# Patient Record
Sex: MALE | Race: BLACK OR AFRICAN AMERICAN | NOT HISPANIC OR LATINO | Employment: UNEMPLOYED | URBAN - METROPOLITAN AREA
[De-identification: names, ages, dates, MRNs, and addresses within clinical notes are randomized per-mention and may not be internally consistent; named-entity substitution may affect disease eponyms.]

---

## 2024-05-14 ENCOUNTER — HOSPITAL ENCOUNTER (EMERGENCY)
Facility: HOSPITAL | Age: 49
Discharge: HOME | End: 2024-05-14
Attending: EMERGENCY MEDICINE

## 2024-05-14 VITALS
OXYGEN SATURATION: 96 % | WEIGHT: 285 LBS | DIASTOLIC BLOOD PRESSURE: 100 MMHG | HEIGHT: 72 IN | HEART RATE: 95 BPM | TEMPERATURE: 98.3 F | BODY MASS INDEX: 38.6 KG/M2 | SYSTOLIC BLOOD PRESSURE: 130 MMHG | RESPIRATION RATE: 19 BRPM

## 2024-05-14 DIAGNOSIS — M54.42 ACUTE LEFT-SIDED LOW BACK PAIN WITH LEFT-SIDED SCIATICA: Primary | ICD-10-CM

## 2024-05-14 DIAGNOSIS — Z76.89 ENCOUNTER TO ESTABLISH CARE WITH NEW DOCTOR: ICD-10-CM

## 2024-05-14 PROCEDURE — 99284 EMERGENCY DEPT VISIT MOD MDM: CPT

## 2024-05-14 PROCEDURE — 96372 THER/PROPH/DIAG INJ SC/IM: CPT

## 2024-05-14 PROCEDURE — 99283 EMERGENCY DEPT VISIT LOW MDM: CPT

## 2024-05-14 RX ORDER — TIZANIDINE 4 MG/1
4 TABLET ORAL ONCE
Status: COMPLETED | OUTPATIENT
Start: 2024-05-14 | End: 2024-05-14

## 2024-05-14 RX ORDER — TIZANIDINE HYDROCHLORIDE 4 MG/1
4 CAPSULE, GELATIN COATED ORAL 3 TIMES DAILY PRN
Qty: 15 CAPSULE | Refills: 0 | OUTPATIENT
Start: 2024-05-14 | End: 2024-05-26

## 2024-05-14 RX ORDER — IBUPROFEN 600 MG/1
TABLET ORAL
Qty: 18 TABLET | Refills: 0 | Status: SHIPPED | OUTPATIENT
Start: 2024-05-14 | End: 2024-05-17 | Stop reason: ALTCHOICE

## 2024-05-14 RX ORDER — ACETAMINOPHEN 325 MG/1
975 TABLET ORAL ONCE
Status: COMPLETED | OUTPATIENT
Start: 2024-05-14 | End: 2024-05-14

## 2024-05-14 RX ORDER — ACETAMINOPHEN 500 MG
1000 TABLET ORAL 3 TIMES DAILY PRN
Qty: 30 TABLET | Refills: 0 | Status: SHIPPED | OUTPATIENT
Start: 2024-05-14 | End: 2024-05-19

## 2024-05-14 RX ORDER — LIDOCAINE 50 MG/G
1 PATCH TOPICAL ONCE
Status: DISCONTINUED | OUTPATIENT
Start: 2024-05-14 | End: 2024-05-14 | Stop reason: HOSPADM

## 2024-05-14 RX ORDER — KETOROLAC TROMETHAMINE 30 MG/ML
30 INJECTION, SOLUTION INTRAMUSCULAR; INTRAVENOUS ONCE
Status: COMPLETED | OUTPATIENT
Start: 2024-05-14 | End: 2024-05-14

## 2024-05-14 RX ADMIN — KETOROLAC TROMETHAMINE 30 MG: 30 INJECTION, SOLUTION INTRAMUSCULAR; INTRAVENOUS at 16:16

## 2024-05-14 RX ADMIN — ACETAMINOPHEN 975 MG: 325 TABLET, FILM COATED ORAL at 16:15

## 2024-05-14 RX ADMIN — LIDOCAINE 5% 1 PATCH: 700 PATCH TOPICAL at 16:18

## 2024-05-14 RX ADMIN — TIZANIDINE 4 MG: 4 TABLET ORAL at 16:15

## 2024-05-14 NOTE — ED PROVIDER NOTES
History  Chief Complaint   Patient presents with    Back Pain     Pt reports his sciatic pain is acting up. Pt normally gets steroid injection with relief but pt just moved here. Did not take any medications today. pt able to ambulate.      The patient is a 48-year-old male with PMH of HTN, HLD, T2DM, and CHF presenting for evaluation of low back pain.  The patient notes having chronic low back pain with exacerbation over the last 3 to 4 days.  He endorses left middle to left lower back constant pain described as stiffness, aching, and shooting.  He notes radiation of pain through the left buttocks down the left posterior leg.  He notes chronic bilateral lower leg neuropathy without new numbness or tingling or weakness.  He notes movement and walking makes the pain worse.  Tylenol has been minimally helpful with his pain control.  He reports moving here 3 weeks ago from [place] for which she hopes to establish care with both primary care.  He reports having access to his current medications and does not have refills.  He denies associated fevers, saddle anesthesia, urinary retention, urinary/bowel incontinence, new numbness or weakness, and prior back surgeries.  He notes having injections in his back 3 to 5 years ago but denies recent steroid use and/or spinal injections.  He denies history of IVDU.      History provided by:  Patient and significant other   used: No        None       Past Medical History:   Diagnosis Date    CHF (congestive heart failure) (HCC)     Diabetes (HCC)     Gout     Hyperlipemia     Hypertension     Sciatica        History reviewed. No pertinent surgical history.    History reviewed. No pertinent family history.  I have reviewed and agree with the history as documented.    E-Cigarette/Vaping     E-Cigarette/Vaping Substances     Social History     Tobacco Use    Smoking status: Never    Smokeless tobacco: Never   Substance Use Topics    Alcohol use: Not  Currently    Drug use: Not Currently       Review of Systems   Respiratory:  Negative for shortness of breath.    Cardiovascular:  Negative for chest pain.   Gastrointestinal:  Negative for abdominal pain, constipation, nausea and vomiting.   Genitourinary: Negative.    Musculoskeletal:  Positive for arthralgias (Bilateral hips, left side worse), back pain and gait problem (Due to low back pain). Negative for joint swelling, myalgias, neck pain and neck stiffness.   Skin:  Negative for color change, pallor, rash and wound.   Neurological:  Positive for numbness (Chronic lower leg numbness due to neuropathy, no change from baseline). Negative for weakness.   All other systems reviewed and are negative.      Physical Exam  Physical Exam  Vitals and nursing note reviewed.   Constitutional:       General: He is not in acute distress (Evidencing mild discomfort, in no acute distress).     Appearance: Normal appearance. He is well-developed. He is not ill-appearing, toxic-appearing or diaphoretic.   HENT:      Head: Normocephalic and atraumatic.      Jaw: There is normal jaw occlusion.      Nose: Nose normal.      Mouth/Throat:      Lips: Pink.      Mouth: Mucous membranes are moist.   Eyes:      Extraocular Movements: Extraocular movements intact.      Conjunctiva/sclera: Conjunctivae normal.   Cardiovascular:      Rate and Rhythm: Normal rate and regular rhythm.      Pulses: Normal pulses.           Radial pulses are 2+ on the right side and 2+ on the left side.        Dorsalis pedis pulses are 2+ on the right side and 2+ on the left side.      Heart sounds: Normal heart sounds, S1 normal and S2 normal. No murmur heard.  Pulmonary:      Effort: Pulmonary effort is normal. No tachypnea or respiratory distress.      Breath sounds: Normal breath sounds and air entry. No stridor, decreased air movement or transmitted upper airway sounds. No decreased breath sounds.   Abdominal:      Palpations: Abdomen is soft.       Tenderness: There is no abdominal tenderness. There is no right CVA tenderness or left CVA tenderness.   Musculoskeletal:      Cervical back: Normal, normal range of motion and neck supple. No swelling, edema, deformity, erythema or bony tenderness. No spinous process tenderness.      Thoracic back: Tenderness (Lower thoracic paraspinal muscle tenderness) present. No swelling, edema, deformity, signs of trauma or bony tenderness. Normal range of motion.      Lumbar back: Tenderness present. No swelling, edema, deformity, signs of trauma or bony tenderness. Decreased range of motion. Positive left straight leg raise test. Negative right straight leg raise test.        Back:       Right hip: Normal. No deformity, tenderness, bony tenderness or crepitus. Normal range of motion.      Left hip: No deformity, tenderness, bony tenderness or crepitus. Decreased range of motion (Secondary to low back pain).      Right upper leg: Normal.      Left upper leg: Normal.      Right knee: Normal.      Left knee: Normal.      Right lower leg: Normal. No edema.      Left lower leg: Normal. No edema.      Right ankle: Normal.      Left ankle: Normal.   Skin:     General: Skin is warm and dry.      Capillary Refill: Capillary refill takes less than 2 seconds.      Findings: No rash or wound.   Neurological:      General: No focal deficit present.      Mental Status: He is alert and oriented to person, place, and time. Mental status is at baseline.      GCS: GCS eye subscore is 4. GCS verbal subscore is 5. GCS motor subscore is 6.      Sensory: Sensation is intact.      Motor: Motor function is intact.      Gait: Gait is intact.      Deep Tendon Reflexes:      Reflex Scores:       Patellar reflexes are 2+ on the right side and 2+ on the left side.        Vital Signs  ED Triage Vitals [05/14/24 1543]   Temperature Pulse Respirations Blood Pressure SpO2   98.3 °F (36.8 °C) 95 19 130/100 96 %      Temp Source Heart Rate Source Patient  Position - Orthostatic VS BP Location FiO2 (%)   Temporal Monitor Sitting Right arm --      Pain Score       10 - Worst Possible Pain           Vitals:    05/14/24 1543   BP: 130/100   Pulse: 95   Patient Position - Orthostatic VS: Sitting         Visual Acuity      ED Medications  Medications   lidocaine (LIDODERM) 5 % patch 1 patch (1 patch Topical Medication Applied 5/14/24 1618)   ketorolac (TORADOL) injection 30 mg (30 mg Intramuscular Given 5/14/24 1616)   acetaminophen (TYLENOL) tablet 975 mg (975 mg Oral Given 5/14/24 1615)   tiZANidine (ZANAFLEX) tablet 4 mg (4 mg Oral Given 5/14/24 1615)       Diagnostic Studies  Results Reviewed       None                   No orders to display              Procedures  Procedures         ED Course                                             Medical Decision Making  DDx including but not limited to: Strain, sprain, sciatica, herniated disc, arthritis, spinal stenosis; considered but doubt fracture; considered but doubt infectious etiology such as epidural abscess    The patient presents with acute on chronic back pain exacerbation.  No midline tenderness.  No overlying skin changes.  Range of motion limited secondary to left lower back pain but patient with full strength.  Bilateral lower leg decreased sensation likely secondary to neuropathy, however patient still able to feel both legs equally.  Reflexes intact.  Discussed obtaining baseline imaging as he is new to the area with no prior documentation.  He at this time wishes to receive pain control and follow-up outpatient and obtain imaging if necessary then.  He is in between insurances and would like to try to figure that out prior to further workup or testing.  Given overall stability of exam and absence of red flags, will start patient on course of NSAIDs, Tylenol, as needed muscle relaxants, referral comfort spine, as well as follow-up with primary care.    Problems Addressed:  Acute left-sided low back pain with  left-sided sciatica: acute illness or injury  Encounter to establish care with new doctor: self-limited or minor problem    Amount and/or Complexity of Data Reviewed  Independent Historian: spouse    Risk  OTC drugs.  Prescription drug management.             Disposition  Final diagnoses:   Acute left-sided low back pain with left-sided sciatica   Encounter to establish care with new doctor     Time reflects when diagnosis was documented in both MDM as applicable and the Disposition within this note       Time User Action Codes Description Comment    5/14/2024  4:02 PM Christine Arrington Add [M54.42] Acute left-sided low back pain with left-sided sciatica     5/14/2024  4:03 PM Christine Arrington [Z76.89] Encounter to establish care with new doctor           ED Disposition       ED Disposition   Discharge    Condition   Stable    Date/Time   Tue May 14, 2024  4:02 PM    Comment   Vamsi Olmedo discharge to home/self care.                   Follow-up Information       Follow up With Specialties Details Why Contact Info Additional Information    [facility] Internal Medicine Schedule an appointment as soon as possible for a visit in 1 week  [address] [place] [id number]  [phone] [facility], [address] [place] [address], [id number] [phone]            Discharge Medication List as of 5/14/2024  4:07 PM        START taking these medications    Details   acetaminophen (TYLENOL) 500 mg tablet Take 2 tablets (1,000 mg total) by mouth 3 (three) times a day as needed for mild pain or moderate pain for up to 5 days, Starting Tue 5/14/2024, Until Sun 5/19/2024 at 2359, Normal      ibuprofen (MOTRIN) 600 mg tablet Multiple Dosages:Starting Tue 5/14/2024, Until Thu 5/16/2024, THEN Starting Fri 5/17/2024, Until Sun 5/19/2024Take 1 tablet (600 mg total) by mouth 3 (three) times a day with meals for 3 days, THEN 1 tablet  (600 mg total) 3 (three) times a day as need ed for mild pain for up to 3 days., Normal      TiZANidine (ZANAFLEX) 4 MG capsule Take 1 capsule (4 mg total) by mouth 3 (three) times a day as needed for muscle spasms for up to 5 days, Starting Tue 5/14/2024, Until Sun 5/19/2024 at 2359, Normal                 PDMP Review       None            ED Provider  Electronically Signed by             ALISON Plascencia  05/14/24 6349

## 2024-05-14 NOTE — DISCHARGE INSTRUCTIONS
Start taking the prescribed ibuprofen (Motrin) 3 times daily with food for the next 3 days.  Use the prescribed high-dose acetaminophen (1000 mg of Tylenol) every 8 hours for total of 3 doses daily for breakthrough pain.  Use topical therapies such as Aspercreme or Bengay cream or heating pad for comfort.  Use the prescribed tizanidine (Zanaflex) up to 3 times a day for muscle spasms.    I have placed referrals for both comprehensive spine and primary care.  Follow-up with comprehensive spine program as they may direct you to physical therapy and/or a spine specialist.  Follow-up with primary care as he may establish care locally for your other existing medical problems.    Return to the ER if develop fever, new numbness or weakness, inability to walk, severe pain that is not responding to the current regimen, confusion, or lethargy.

## 2024-05-15 ENCOUNTER — TELEPHONE (OUTPATIENT)
Dept: PHYSICAL THERAPY | Facility: OTHER | Age: 49
End: 2024-05-15

## 2024-05-15 NOTE — TELEPHONE ENCOUNTER
Call placed to the patient per Comprehensive Spine program referral.    V/m left for patient to call back. Phone number and hours of business provided.    Thi sis the 1st attempt to reach the patient. Will defer referral per protocol.    H . I ??

## 2024-05-17 ENCOUNTER — OFFICE VISIT (OUTPATIENT)
Dept: FAMILY MEDICINE CLINIC | Facility: CLINIC | Age: 49
End: 2024-05-17

## 2024-05-17 VITALS
HEIGHT: 73 IN | OXYGEN SATURATION: 97 % | RESPIRATION RATE: 17 BRPM | BODY MASS INDEX: 39.04 KG/M2 | WEIGHT: 294.6 LBS | HEART RATE: 89 BPM | SYSTOLIC BLOOD PRESSURE: 120 MMHG | TEMPERATURE: 96.5 F | DIASTOLIC BLOOD PRESSURE: 82 MMHG

## 2024-05-17 DIAGNOSIS — I50.9 CONGESTIVE HEART FAILURE, UNSPECIFIED HF CHRONICITY, UNSPECIFIED HEART FAILURE TYPE (HCC): ICD-10-CM

## 2024-05-17 DIAGNOSIS — I10 HYPERTENSION, UNSPECIFIED TYPE: ICD-10-CM

## 2024-05-17 DIAGNOSIS — E11.69 TYPE 2 DIABETES MELLITUS WITH OTHER SPECIFIED COMPLICATION, WITH LONG-TERM CURRENT USE OF INSULIN (HCC): ICD-10-CM

## 2024-05-17 DIAGNOSIS — Z79.4 TYPE 2 DIABETES MELLITUS WITH OTHER SPECIFIED COMPLICATION, WITH LONG-TERM CURRENT USE OF INSULIN (HCC): ICD-10-CM

## 2024-05-17 DIAGNOSIS — M54.32 SCIATICA OF LEFT SIDE: ICD-10-CM

## 2024-05-17 DIAGNOSIS — Z76.89 ENCOUNTER TO ESTABLISH CARE WITH NEW DOCTOR: Primary | ICD-10-CM

## 2024-05-17 PROCEDURE — 99204 OFFICE O/P NEW MOD 45 MIN: CPT

## 2024-05-17 RX ORDER — COLCHICINE 0.6 MG/1
0.6 TABLET ORAL 2 TIMES DAILY PRN
COMMUNITY

## 2024-05-17 RX ORDER — SCOLOPAMINE TRANSDERMAL SYSTEM 1 MG/1
1 PATCH, EXTENDED RELEASE TRANSDERMAL
COMMUNITY

## 2024-05-17 RX ORDER — ARIPIPRAZOLE 10 MG/1
10 TABLET ORAL DAILY
COMMUNITY

## 2024-05-17 RX ORDER — GABAPENTIN 300 MG/1
300 CAPSULE ORAL
COMMUNITY

## 2024-05-17 RX ORDER — OMEPRAZOLE 40 MG/1
40 CAPSULE, DELAYED RELEASE ORAL DAILY
COMMUNITY
Start: 2024-04-19

## 2024-05-17 RX ORDER — FUROSEMIDE 40 MG/1
40 TABLET ORAL DAILY
COMMUNITY

## 2024-05-17 RX ORDER — QUETIAPINE FUMARATE 50 MG/1
50 TABLET, FILM COATED ORAL
COMMUNITY
End: 2024-05-17 | Stop reason: ALTCHOICE

## 2024-05-17 RX ORDER — SPIRONOLACTONE 25 MG/1
25 TABLET ORAL DAILY
COMMUNITY

## 2024-05-17 RX ORDER — PEN NEEDLE, DIABETIC 32GX 5/32"
1 NEEDLE, DISPOSABLE MISCELLANEOUS
COMMUNITY
Start: 2024-02-09

## 2024-05-17 RX ORDER — HYDROXYZINE HYDROCHLORIDE 25 MG/1
25 TABLET, FILM COATED ORAL
COMMUNITY

## 2024-05-17 RX ORDER — SACUBITRIL AND VALSARTAN 24; 26 MG/1; MG/1
1 TABLET, FILM COATED ORAL 2 TIMES DAILY
COMMUNITY

## 2024-05-17 RX ORDER — ATORVASTATIN CALCIUM 80 MG/1
80 TABLET, FILM COATED ORAL DAILY
COMMUNITY

## 2024-05-17 RX ORDER — PREDNISONE 10 MG/1
10 TABLET ORAL DAILY
Qty: 30 TABLET | Refills: 0 | OUTPATIENT
Start: 2024-05-17 | End: 2024-05-26

## 2024-05-17 RX ORDER — GLUCOSAM/CHON-MSM1/C/MANG/BOSW 500-416.6
1 TABLET ORAL 2 TIMES DAILY
COMMUNITY
Start: 2024-05-14

## 2024-05-17 RX ORDER — ONDANSETRON 4 MG/1
4 TABLET, ORALLY DISINTEGRATING ORAL EVERY 6 HOURS PRN
COMMUNITY

## 2024-05-17 RX ORDER — LAMOTRIGINE 150 MG/1
150 TABLET ORAL 2 TIMES DAILY
COMMUNITY

## 2024-05-17 RX ORDER — CALCIUM CITRATE/VITAMIN D3 200MG-6.25
1 TABLET ORAL 3 TIMES DAILY PRN
COMMUNITY
Start: 2024-03-15

## 2024-05-17 RX ORDER — AMITRIPTYLINE HYDROCHLORIDE 10 MG/1
10 TABLET, FILM COATED ORAL
COMMUNITY

## 2024-05-17 RX ORDER — METOPROLOL SUCCINATE 100 MG/1
100 TABLET, EXTENDED RELEASE ORAL
COMMUNITY

## 2024-05-17 RX ORDER — POTASSIUM CHLORIDE 20 MEQ/1
20 TABLET, EXTENDED RELEASE ORAL DAILY
COMMUNITY

## 2024-05-17 RX ORDER — DULOXETIN HYDROCHLORIDE 60 MG/1
60 CAPSULE, DELAYED RELEASE ORAL DAILY
COMMUNITY

## 2024-05-17 RX ORDER — DAPAGLIFLOZIN 10 MG/1
TABLET, FILM COATED ORAL DAILY
COMMUNITY

## 2024-05-17 RX ORDER — INSULIN GLARGINE 100 [IU]/ML
58 INJECTION, SOLUTION SUBCUTANEOUS
COMMUNITY

## 2024-05-17 RX ORDER — OMEPRAZOLE 20 MG/1
20 CAPSULE, DELAYED RELEASE ORAL DAILY
COMMUNITY
End: 2024-05-17 | Stop reason: DRUGHIGH

## 2024-05-17 RX ORDER — ERGOCALCIFEROL 1.25 MG/1
50000 CAPSULE ORAL WEEKLY
COMMUNITY

## 2024-05-17 NOTE — ASSESSMENT & PLAN NOTE
Implanted defibrillator first 2014, replaced 09/2023. Referral to cardiology placed today. Patient to obtain labs ordered today.    Wt Readings from Last 3 Encounters:   05/17/24 134 kg (294 lb 9.6 oz)   05/14/24 129 kg (285 lb)

## 2024-05-17 NOTE — PROGRESS NOTES
Ambulatory Visit  Name: Vamsi Olmedo      : 1975      MRN: [id number]  Encounter Provider: ALISON Cabello  Encounter Date: 2024   Encounter department: [facility]    Assessment & Plan   1. Encounter to establish care with new doctor  -     Ambulatory Referral to Family Practice  -     CBC and differential; Future  -     predniSONE 10 mg tablet; Take 1 tablet (10 mg total) by mouth daily Take 4 tablets for the first 3 days, 3 tablets for the next 3 days, 2 tablets for the following 3 days, and 1 tablet for the last 3 days  -     Comprehensive metabolic panel; Future  -     Lipid Panel with Direct LDL reflex; Future  -     TSH, 3rd generation with Free T4 reflex; Future  -     Hemoglobin A1C; Future  -     UA (URINE) with reflex to Scope; Future  -     Ambulatory Referral to Orthopedic Surgery; Future  -     Ambulatory Referral to Endocrinology; Future  -     Ambulatory Referral to Neurology; Future  -     Ambulatory referral to Psych Services; Future  -     Ambulatory Referral to Social Work Care Management Program; Future  -     Ambulatory Referral to Gastroenterology; Future  -     Ambulatory Referral to Ophthalmology; Future  -     Ambulatory Referral to Dentistry; Future  -     Ambulatory Referral to Cardiology; Future  -     Ambulatory Referral to Nephrology; Future  -     Ambulatory Referral to Podiatry; Future  -     Ambulatory Referral to Physical Therapy; Future  2. Congestive heart failure, unspecified HF chronicity, unspecified heart failure type (HCC)  Assessment & Plan:  Implanted defibrillator first , replaced 2023. Referral to cardiology placed today. Patient to obtain labs ordered today.    Wt Readings from Last 3 Encounters:   24 134 kg (294 lb 9.6 oz)   24 129 kg (285 lb)             Orders:  -     CBC and differential; Future  -     Comprehensive metabolic panel; Future  -     Lipid Panel with Direct LDL reflex; Future  3.  "Sciatica of left side  Assessment & Plan:  Referrals to orthopedics and physical therapy placed today. Prednisone taper ordered today. Patient aware of likely blood sugar elevation. Patient to obtain labs ordered today.  Orders:  -     predniSONE 10 mg tablet; Take 1 tablet (10 mg total) by mouth daily Take 4 tablets for the first 3 days, 3 tablets for the next 3 days, 2 tablets for the following 3 days, and 1 tablet for the last 3 days  -     Ambulatory Referral to Orthopedic Surgery; Future  -     Ambulatory Referral to Physical Therapy; Future  4. Type 2 diabetes mellitus with other specified complication, with long-term current use of insulin (HCC)  Assessment & Plan:  Referral to endocrinology placed today. Patient to obtain labs ordered today.  No results found for: \"HGBA1C\"  Orders:  -     Ambulatory Referral to Endocrinology; Future  -     Ambulatory Referral to Social Work Care Management Program; Future  -     Ambulatory Referral to Ophthalmology; Future  -     Ambulatory Referral to Nephrology; Future  -     Ambulatory Referral to Podiatry; Future  5. Hypertension, unspecified type  Assessment & Plan:  Current defibrillator implanted originally 2014 and replaced 09/2023. Referral to cardiology placed today. Patient to obtain labs ordered today.    Referrals to multiple specialists placed today. The patient will obtain the lab work ordered today prior to the next scheduled appointment. The patient will be notified of the results when available. Follow up in 3 months or as needed.    Depression Screening and Follow-up Plan: Patient's depression screening was positive with a PHQ-2 score of 4. Their PHQ-9 score was 12. Continue regular follow-up with their mental health provider who is managing their mental health condition(s). Patient with underlying depression and was advised to continue current medications as prescribed.       History of Present Illness     Vamsi Olmedo is a 48 year old male who presents " today to establish care. He relocated from [place], [place] 3-4 weeks ago. Moved here with his girlfriend Trinity who is with him today. Previous medical system was [facility] in [place], [place].  The patient reports following with multiple specialists including cardiology, psychiatry, endocrine, neurology, nephrology, opthalmology, gastroenterology, and podiatry. He reports current neuropathy of feet and hands. He reports he checks his blood sugar 2-3x/day, mostly morning and night. The patient reports current usage of mag citrate for constipation with relief. The patient was recently seen in the emergency department due to back pain and worsening sciatica. He reports current back pain with radiation to his legs. He has followed with orthopedics in the past and would reports steroid injection has been beneficial in the past.  The patient would like referrals to local specialists including dentistry.    Back Pain  This is a chronic problem. The problem is unchanged. The pain is present in the lumbar spine. The quality of the pain is described as shooting and aching. The pain radiates to the left knee and left thigh. The pain is moderate. The symptoms are aggravated by sitting. Associated symptoms include numbness (hands and feet) and tingling. Pertinent negatives include no abdominal pain, chest pain, dysuria, fever, headaches or weakness. Risk factors include obesity and lack of exercise. He has tried analgesics and heat for the symptoms. The treatment provided mild relief.       Review of Systems   Constitutional: Negative.  Negative for chills, fatigue and fever.   HENT: Negative.  Negative for congestion, ear pain, rhinorrhea and sore throat.    Eyes: Negative.  Negative for pain and visual disturbance.   Respiratory: Negative.  Negative for cough and shortness of breath.    Cardiovascular: Negative.  Negative for chest pain, palpitations and leg swelling.   Gastrointestinal:  Negative for abdominal pain, constipation,  "diarrhea, nausea and vomiting.   Endocrine: Negative.    Genitourinary: Negative.  Negative for dysuria, frequency and urgency.   Musculoskeletal:  Positive for arthralgias, back pain and gait problem. Negative for joint swelling, myalgias and neck pain.   Skin: Negative.  Negative for rash.   Allergic/Immunologic: Negative.    Neurological:  Positive for tingling and numbness (hands and feet). Negative for dizziness, weakness, light-headedness and headaches.   Hematological: Negative.    Psychiatric/Behavioral: Negative.         Objective     /82 (BP Location: Left arm, Patient Position: Sitting, Cuff Size: Large)   Pulse 89   Temp (!) 96.5 °F (35.8 °C) (Tympanic)   Resp 17   Ht 6' 1\" (1.854 m)   Wt 134 kg (294 lb 9.6 oz)   SpO2 97%   BMI 38.87 kg/m²     Physical Exam  Vitals and nursing note reviewed.   Constitutional:       General: He is not in acute distress.     Appearance: Normal appearance. He is obese. He is not ill-appearing.   HENT:      Head: Normocephalic and atraumatic.   Cardiovascular:      Rate and Rhythm: Normal rate and regular rhythm.      Heart sounds: Normal heart sounds. No murmur heard.  Pulmonary:      Effort: Pulmonary effort is normal. No respiratory distress.      Breath sounds: Normal breath sounds. No wheezing.   Abdominal:      General: Bowel sounds are normal.      Palpations: Abdomen is soft.      Tenderness: There is no abdominal tenderness.   Musculoskeletal:         General: Tenderness present. No deformity.      Cervical back: Normal range of motion. No tenderness.      Lumbar back: Bony tenderness present. No swelling or tenderness. Decreased range of motion. Positive left straight leg raise test.        Back:    Skin:     General: Skin is warm and dry.      Capillary Refill: Capillary refill takes less than 2 seconds.   Neurological:      General: No focal deficit present.      Mental Status: He is alert and oriented to person, place, and time.   Psychiatric:       "   Mood and Affect: Mood normal.         Behavior: Behavior normal.       Administrative Statements

## 2024-05-20 ENCOUNTER — TELEPHONE (OUTPATIENT)
Dept: PSYCHIATRY | Facility: CLINIC | Age: 49
End: 2024-05-20

## 2024-05-20 NOTE — ASSESSMENT & PLAN NOTE
"Referral to endocrinology placed today. Patient to obtain labs ordered today.  No results found for: \"HGBA1C\"  "

## 2024-05-20 NOTE — ASSESSMENT & PLAN NOTE
Referrals to orthopedics and physical therapy placed today. Prednisone taper ordered today. Patient aware of likely blood sugar elevation. Patient to obtain labs ordered today.

## 2024-05-20 NOTE — TELEPHONE ENCOUNTER
Contacted patient in regards to Routine Referral in attempts to verify patient's needs of services and add patient to proper wait list. Writer left  to call intake at [phone]

## 2024-05-20 NOTE — ASSESSMENT & PLAN NOTE
Current defibrillator implanted originally 2014 and replaced 09/2023. Referral to cardiology placed today. Patient to obtain labs ordered today.

## 2024-05-22 ENCOUNTER — PATIENT OUTREACH (OUTPATIENT)
Dept: FAMILY MEDICINE CLINIC | Facility: CLINIC | Age: 49
End: 2024-05-22

## 2024-05-22 NOTE — PROGRESS NOTES
ASHA BOBBY received a referral for patient from their PCP with no reason for referral provided. ASHA BOBBY reviewed patient's chart and called patient ([phone]). Patient answered and ASHA BOBBY introduced kristen and reason for calling. ASHA BOBBY completed SOC psychosocial assessment with patient.     Patient just recently moved from [place] and is living with his SO in a family memebers home. Patient had MA in [place] and has applied for it in [place]. Patient reports he needs to submit additional documentation that he is waiting on from [place]. He is unemployed and does not have any income. He stated that when he was in [place] he applied for SSD and was denied but appealed with documentation from his doctor. Patient stated he is waiting for the outcome of the appeal. He utilizes an SPC but is independent with all ADLs/ IADLs. Due to neuropathy, he cannot drive but he does have a car. Patient reports his OS drives him to appointments. He did switch his SNAP from [place] to [place] and it will be active as of 6/7/24. Patient does report MH hx but no current SI, HI or plan to harm himself or others. ASHA BOBBY sent patient list of in network MH providers from the [facility] [employer] once his MA is approved so that he can call and make an appointment ([email or web address]). Patient had no other questions, concerns or needs. ASHA BOBBY will close. Please re consult ASHA BOBBY as needed.       Waiting for insuranc eto go through to establish with OP MH resources.     [email or web address]

## 2024-05-24 ENCOUNTER — TELEPHONE (OUTPATIENT)
Dept: PSYCHIATRY | Facility: CLINIC | Age: 49
End: 2024-05-24

## 2024-05-24 NOTE — TELEPHONE ENCOUNTER
Contacted patient in regards to Routine Referral in attempts to verify patient's needs of services and add patient to proper wait list. Writer left vm to call intake at [phone]      Attempt #2

## 2024-05-26 ENCOUNTER — HOSPITAL ENCOUNTER (EMERGENCY)
Facility: HOSPITAL | Age: 49
Discharge: HOME | End: 2024-05-26
Attending: EMERGENCY MEDICINE

## 2024-05-26 VITALS
DIASTOLIC BLOOD PRESSURE: 80 MMHG | RESPIRATION RATE: 18 BRPM | HEART RATE: 88 BPM | OXYGEN SATURATION: 97 % | SYSTOLIC BLOOD PRESSURE: 117 MMHG | TEMPERATURE: 97.7 F

## 2024-05-26 DIAGNOSIS — M54.32 SCIATICA OF LEFT SIDE: Primary | ICD-10-CM

## 2024-05-26 PROCEDURE — 99284 EMERGENCY DEPT VISIT MOD MDM: CPT | Performed by: EMERGENCY MEDICINE

## 2024-05-26 PROCEDURE — 96372 THER/PROPH/DIAG INJ SC/IM: CPT

## 2024-05-26 PROCEDURE — 99283 EMERGENCY DEPT VISIT LOW MDM: CPT

## 2024-05-26 RX ORDER — KETOROLAC TROMETHAMINE 30 MG/ML
30 INJECTION, SOLUTION INTRAMUSCULAR; INTRAVENOUS ONCE
Status: COMPLETED | OUTPATIENT
Start: 2024-05-26 | End: 2024-05-26

## 2024-05-26 RX ORDER — METHOCARBAMOL 500 MG/1
500 TABLET, FILM COATED ORAL 4 TIMES DAILY PRN
Qty: 20 TABLET | Refills: 0 | Status: SHIPPED | OUTPATIENT
Start: 2024-05-26

## 2024-05-26 RX ORDER — KETOROLAC TROMETHAMINE 30 MG/ML
30 INJECTION, SOLUTION INTRAMUSCULAR; INTRAVENOUS ONCE
Status: DISCONTINUED | OUTPATIENT
Start: 2024-05-26 | End: 2024-05-26

## 2024-05-26 RX ADMIN — KETOROLAC TROMETHAMINE 30 MG: 30 INJECTION, SOLUTION INTRAMUSCULAR; INTRAVENOUS at 11:08

## 2024-05-26 NOTE — ED PROVIDER NOTES
History  Chief Complaint   Patient presents with    Back Pain     Patient presents to the ED with c/o sciatic pain for a few weeks, states he was put on steroids and it did not help.      Hx from patient and wife, [facility] copd, chf, DM, sciatica, htn, schizoaffective, GERD.  Patient c/o Exacerbation of chronic back pain.  He is new to the area.  He has left low back pain radiating down left leg to above ankle.  Sharp shooting pain worse with movement, worsening over 3 weeks now.  No specific injury.  No new numbness or weakness, no loss of bowel or bladder control.   No relief with prednisone and lidocaine patch Tylenol or tizanidine.  His blood sugar spiked from the prednisone.        Prior to Admission Medications   Prescriptions Last Dose Informant Patient Reported? Taking?   ARIPiprazole (ABILIFY) 10 mg tablet  Self Yes No   Sig: Take 10 mg by mouth daily   DULoxetine (CYMBALTA) 60 mg delayed release capsule  Self Yes No   Sig: Take 60 mg by mouth daily   Diclofenac Sodium (VOLTAREN) 1 %  Self Yes No   Sig: Apply 2 g topically 4 (four) times a day   Insulin Glargine Solostar (Basaglar KwikPen) 100 UNIT/ML SOPN  Self Yes No   Sig: Inject 58 Units under the skin once daily   TRUEplus 5-Bevel Pen Needles 32G X 4 MM MISC   Yes No   Sig: Inject 1 each under the skin   TRUEplus Lancets 30G MISC   Yes No   Si Lancet 2 (two) times a day Check blood sugar   TiZANidine (ZANAFLEX) 4 MG capsule  Self No No   Sig: Take 1 capsule (4 mg total) by mouth 3 (three) times a day as needed for muscle spasms for up to 5 days   True Metrix Blood Glucose Test test strip   Yes No   Sig: Use 1 each 3 (three) times a day as needed   amitriptyline (ELAVIL) 10 mg tablet  Self Yes No   Sig: Take 10 mg by mouth daily at bedtime   atorvastatin (LIPITOR) 80 mg tablet  Self Yes No   Sig: Take 80 mg by mouth daily   colchicine (COLCRYS) 0.6 mg tablet   Yes No   Sig: Take 0.6 mg by mouth 2 (two) times a day as needed for muscle/joint pain    dapagliflozin (Farxiga) 10 MG tablet  Self Yes No   Sig: Take by mouth daily   dulaglutide (Trulicity) 4.5 MG/0.5ML injection  Self Yes No   Sig: Inject 4.5 mg under the skin every 7 days   ergocalciferol (ERGOCALCIFEROL) 1.25 MG (30905 UT) capsule  Self Yes No   Sig: Take 50,000 Units by mouth once a week   furosemide (LASIX) 40 mg tablet  Self Yes No   Sig: Take 40 mg by mouth daily   gabapentin (NEURONTIN) 300 mg capsule  Self Yes No   Sig: Take 300 mg by mouth Takes 900mg daily   hydrOXYzine HCL (ATARAX) 25 mg tablet  Self Yes No   Sig: Take 25 mg by mouth daily at bedtime as needed for itching   lamoTRIgine (LaMICtal) 150 MG tablet  Self Yes No   Sig: Take 150 mg by mouth 2 (two) times a day   metFORMIN (GLUCOPHAGE) 1000 MG tablet  Self Yes No   Sig: Take 1,000 mg by mouth 2 (two) times a day with meals   metoprolol succinate (TOPROL-XL) 100 mg 24 hr tablet  Self Yes No   Sig: Take 100 mg by mouth Takes 1 tablet in a.m. and 2 tablets p.m.   omeprazole (PriLOSEC) 40 MG capsule   Yes No   Sig: Take 40 mg by mouth daily   ondansetron (ZOFRAN-ODT) 4 mg disintegrating tablet  Self Yes No   Sig: Take 4 mg by mouth every 6 (six) hours as needed for nausea or vomiting   potassium chloride (Klor-Con M20) 20 mEq tablet  Self Yes No   Sig: Take 20 mEq by mouth daily   predniSONE 10 mg tablet   No No   Sig: Take 1 tablet (10 mg total) by mouth daily Take 4 tablets for the first 3 days, 3 tablets for the next 3 days, 2 tablets for the following 3 days, and 1 tablet for the last 3 days   sacubitril-valsartan (Entresto) 24-26 MG TABS  Self Yes No   Sig: Take 1 tablet by mouth 2 (two) times a day   scopolamine (TRANSDERM-SCOP) 1 mg/3 days TD 72 hr patch  Self Yes No   Sig: Place 1 patch on the skin every third day   spironolactone (ALDACTONE) 25 mg tablet  Self Yes No   Sig: Take 25 mg by mouth daily      Facility-Administered Medications: None       Past Medical History:   Diagnosis Date    Alcohol abuse     sober for 1  year with 1 relapse - currently not drinking    Arthritis     CHF (congestive heart failure) (HCC)     Diabetes (HCC)     Diabetic neuropathy associated with type 2 diabetes mellitus (HCC)     GERD (gastroesophageal reflux disease)     Gout     Hyperlipemia     Hypertension     Memory loss     Obesity     Schizo affective schizophrenia (HCC)     Sciatica     Seizures (HCC)     2018 - was in coma    Visual impairment        Past Surgical History:   Procedure Laterality Date    CARDIAC DEFIBRILLATOR PLACEMENT      2014    CARDIAC DEFIBRILLATOR PLACEMENT      replaced september 2023    CARDIAC DEFIBRILLATOR REMOVAL      Sept 2023       History reviewed. No pertinent family history.  I have reviewed and agree with the history as documented.    E-Cigarette/Vaping    E-Cigarette Use Never User      E-Cigarette/Vaping Substances     Social History     Tobacco Use    Smoking status: Former     Types: Cigars    Smokeless tobacco: Never   Vaping Use    Vaping status: Never Used   Substance Use Topics    Alcohol use: Not Currently    Drug use: Not Currently     Types: Marijuana       Review of Systems   Constitutional:  Negative for chills and fever.   HENT:  Negative for ear pain and sore throat.    Eyes:  Negative for pain and visual disturbance.   Respiratory:  Negative for cough and shortness of breath.    Cardiovascular:  Negative for chest pain and palpitations.   Gastrointestinal:  Negative for abdominal pain and vomiting.   Genitourinary:  Negative for dysuria and hematuria.   Musculoskeletal:  Positive for back pain. Negative for arthralgias.   Skin:  Negative for color change and rash.   Neurological:  Negative for seizures and syncope.   All other systems reviewed and are negative.      Physical Exam  Physical Exam  Vitals and nursing note reviewed.   Constitutional:       General: He is not in acute distress.     Appearance: He is well-developed. He is obese.   HENT:      Head: Normocephalic and atraumatic.   Eyes:       Conjunctiva/sclera: Conjunctivae normal.   Cardiovascular:      Rate and Rhythm: Normal rate and regular rhythm.      Heart sounds: No murmur heard.  Pulmonary:      Effort: Pulmonary effort is normal. No respiratory distress.      Breath sounds: Normal breath sounds.   Abdominal:      Palpations: Abdomen is soft.      Tenderness: There is no abdominal tenderness.   Musculoskeletal:         General: No swelling.      Cervical back: Neck supple. No bony tenderness.      Thoracic back: No bony tenderness.      Lumbar back: Tenderness present. No bony tenderness. Positive left straight leg raise test. Negative right straight leg raise test.        Back:    Skin:     General: Skin is warm and dry.      Capillary Refill: Capillary refill takes less than 2 seconds.   Neurological:      Mental Status: He is alert.      Sensory: No sensory deficit.      Motor: No weakness.   Psychiatric:         Mood and Affect: Mood normal.         Vital Signs  ED Triage Vitals   Temperature Pulse Respirations Blood Pressure SpO2   05/26/24 1005 05/26/24 1005 05/26/24 1005 05/26/24 1007 05/26/24 1005   97.7 °F (36.5 °C) 90 18 136/74 99 %      Temp Source Heart Rate Source Patient Position - Orthostatic VS BP Location FiO2 (%)   05/26/24 1005 05/26/24 1005 05/26/24 1005 05/26/24 1005 --   Temporal Monitor Sitting Left arm       Pain Score       05/26/24 1005       10 - Worst Possible Pain           Vitals:    05/26/24 1005 05/26/24 1007 05/26/24 1100   BP:  136/74 117/80   Pulse: 90  88   Patient Position - Orthostatic VS: Sitting  Sitting         Visual Acuity      ED Medications  Medications   ketorolac (TORADOL) injection 30 mg (30 mg Intramuscular Given 5/26/24 1108)       Diagnostic Studies  Results Reviewed       None                   No orders to display              Procedures  Procedures         ED Course                               SBIRT 20yo+      Flowsheet Row Most Recent Value   Initial Alcohol Screen: US AUDIT-C     1.  How often do you have a drink containing alcohol? 0 Filed at: 05/26/2024 1007   2. How many drinks containing alcohol do you have on a typical day you are drinking?  0 Filed at: 05/26/2024 1007   3a. Male UNDER 65: How often do you have five or more drinks on one occasion? 0 Filed at: 05/26/2024 1007   3b. FEMALE Any Age, or MALE 65+: How often do you have 4 or more drinks on one occassion? 0 Filed at: 05/26/2024 1007   Audit-C Score 0 Filed at: 05/26/2024 [facility]   JAYRO: How many times in the past year have you...    Used an illegal drug or used a prescription medication for non-medical reasons? Never Filed at: 05/26/2024 [facility]                      Medical Decision Making  Differential includes exacerbation of left low back pain, sciatica, lumbar radiculopathy, piriformis syndrome, less likely compression fracture or cauda equina or cord compression.    Risk  Prescription drug management.             Disposition  Final diagnoses:   Sciatica of left side     Time reflects when diagnosis was documented in both MDM as applicable and the Disposition within this note       Time User Action Codes Description Comment    5/26/2024 11:04 AM Dank Ryder Add [M54.32] Sciatica of left side           ED Disposition       ED Disposition   Discharge    Condition   Stable    Date/Time   Sun May 26, 2024 1104    Comment   Vamsi Olmedo discharge to home/self care.                   Follow-up Information       Follow up With Specialties Details Why Contact Info Additional Information    [facility] [place] Schedule an appointment as soon as possible for a visit   [address] [facility] [facility] [address]  [facility] [address]  [facility] [id number] [phone] [facility] [place], [address] [place], [place] [address], [place], [place], [id number] [phone]            Discharge Medication List as of 5/26/2024 11:06 AM        START taking these medications    Details   methocarbamol (ROBAXIN) 500 mg tablet Take 1 tablet  (500 mg total) by mouth 4 (four) times a day as needed for muscle spasms, Starting Sun 5/26/2024, Normal           CONTINUE these medications which have NOT CHANGED    Details   amitriptyline (ELAVIL) 10 mg tablet Take 10 mg by mouth daily at bedtime, Historical Med      ARIPiprazole (ABILIFY) 10 mg tablet Take 10 mg by mouth daily, Historical Med      atorvastatin (LIPITOR) 80 mg tablet Take 80 mg by mouth daily, Historical Med      colchicine (COLCRYS) 0.6 mg tablet Take 0.6 mg by mouth 2 (two) times a day as needed for muscle/joint pain, Historical Med      dapagliflozin (Farxiga) 10 MG tablet Take by mouth daily, Historical Med      Diclofenac Sodium (VOLTAREN) 1 % Apply 2 g topically 4 (four) times a day, Historical Med      dulaglutide (Trulicity) 4.5 MG/0.5ML injection Inject 4.5 mg under the skin every 7 days, Historical Med      DULoxetine (CYMBALTA) 60 mg delayed release capsule Take 60 mg by mouth daily, Historical Med      ergocalciferol (ERGOCALCIFEROL) 1.25 MG (68534 UT) capsule Take 50,000 Units by mouth once a week, Historical Med      furosemide (LASIX) 40 mg tablet Take 40 mg by mouth daily, Historical Med      gabapentin (NEURONTIN) 300 mg capsule Take 300 mg by mouth Takes 900mg daily, Historical Med      hydrOXYzine HCL (ATARAX) 25 mg tablet Take 25 mg by mouth daily at bedtime as needed for itching, Historical Med      Insulin Glargine Solostar (Basaglar KwikPen) 100 UNIT/ML SOPN Inject 58 Units under the skin once daily, Historical Med      lamoTRIgine (LaMICtal) 150 MG tablet Take 150 mg by mouth 2 (two) times a day, Historical Med      metFORMIN (GLUCOPHAGE) 1000 MG tablet Take 1,000 mg by mouth 2 (two) times a day with meals, Historical Med      metoprolol succinate (TOPROL-XL) 100 mg 24 hr tablet Take 100 mg by mouth Takes 1 tablet in a.m. and 2 tablets p.m., Historical Med      omeprazole (PriLOSEC) 40 MG capsule Take 40 mg by mouth daily, Starting Fri 4/19/2024, Historical Med       ondansetron (ZOFRAN-ODT) 4 mg disintegrating tablet Take 4 mg by mouth every 6 (six) hours as needed for nausea or vomiting, Historical Med      potassium chloride (Klor-Con M20) 20 mEq tablet Take 20 mEq by mouth daily, Historical Med      sacubitril-valsartan (Entresto) 24-26 MG TABS Take 1 tablet by mouth 2 (two) times a day, Historical Med      scopolamine (TRANSDERM-SCOP) 1 mg/3 days TD 72 hr patch Place 1 patch on the skin every third day, Historical Med      spironolactone (ALDACTONE) 25 mg tablet Take 25 mg by mouth daily, Historical Med      True Metrix Blood Glucose Test test strip Use 1 each 3 (three) times a day as needed, Starting Fri 3/15/2024, Historical Med      TRUEplus 5-Bevel Pen Needles 32G X 4 MM MISC Inject 1 each under the skin, Starting Fri 2/9/2024, Historical Med      TRUEplus Lancets 30G MISC 1 Lancet 2 (two) times a day Check blood sugar, Starting Tue 5/14/2024, Historical Med           STOP taking these medications       predniSONE 10 mg tablet Comments:   Reason for Stopping:         TiZANidine (ZANAFLEX) 4 MG capsule Comments:   Reason for Stopping:               No discharge procedures on file.    PDMP Review       None            ED Provider  Electronically Signed by             Dank Ryder DO  05/26/24 1262

## 2024-06-03 NOTE — TELEPHONE ENCOUNTER
Spoke to pt in regards to routine referral. Added to epic wait list for med mgmt at [facility]. Pt will have medicaid insurance by the end of the month.

## 2024-06-12 ENCOUNTER — OFFICE VISIT (OUTPATIENT)
Dept: FAMILY MEDICINE CLINIC | Facility: CLINIC | Age: 49
End: 2024-06-12

## 2024-06-12 VITALS
HEART RATE: 94 BPM | DIASTOLIC BLOOD PRESSURE: 78 MMHG | RESPIRATION RATE: 18 BRPM | SYSTOLIC BLOOD PRESSURE: 110 MMHG | TEMPERATURE: 97.9 F | OXYGEN SATURATION: 97 %

## 2024-06-12 DIAGNOSIS — M54.31 BILATERAL SCIATICA: Primary | ICD-10-CM

## 2024-06-12 DIAGNOSIS — R69 MULTIPLE MEDICAL PROBLEMS: ICD-10-CM

## 2024-06-12 DIAGNOSIS — M54.32 SCIATICA OF LEFT SIDE: ICD-10-CM

## 2024-06-12 DIAGNOSIS — M54.32 BILATERAL SCIATICA: Primary | ICD-10-CM

## 2024-06-12 PROCEDURE — 99213 OFFICE O/P EST LOW 20 MIN: CPT

## 2024-06-12 RX ORDER — METHOCARBAMOL 500 MG/1
500 TABLET, FILM COATED ORAL 4 TIMES DAILY PRN
Qty: 30 TABLET | Refills: 0 | Status: SHIPPED | OUTPATIENT
Start: 2024-06-12

## 2024-06-12 NOTE — PROGRESS NOTES
Ambulatory Visit  Name: Vamsi Olmedo      : 1975      MRN: [id number]  Encounter Provider: ALISON Cabello  Encounter Date: 2024   Encounter department: [facility]    Assessment & Plan   1. Bilateral sciatica  Assessment & Plan:  The patient has an upcoming appointment with orthopedics. A referral was placed to spine and pain management today. Methocarbamol 500 mg refilled today. The patient has a large back muscle spasm. Recommend heat, massage, and lidocaine patch.  Orders:  -     Ambulatory referral to Spine & Pain Management; Future  -     methocarbamol (ROBAXIN) 500 mg tablet; Take 1 tablet (500 mg total) by mouth 4 (four) times a day as needed for muscle spasms  2. Sciatica of left side  Assessment & Plan:  The patient has an upcoming appointment with orthopedics. A referral was placed to spine and pain management today. Methocarbamol 500 mg refilled today. The patient has a large back muscle spasm. Recommend heat, massage, and lidocaine patch.  3. Multiple medical problems  Assessment & Plan:  The patient requires assistance navigating the medical system and establishing with new specialists. A referral to complex care management was placed today. He is currently self pay and in the process of obtaining Medicaid.  Orders:  -     Ambulatory Referral to Complex Care Management Program; Future    The patient was reminded to obtain the lab work ordered at the previous visit. The AVS from our last visit was reprinted so the patient has the associated phone numbers for the specialists he was referred to prior. The patient will schedule with the referred providers. Follow up as needed or at next regularly scheduled appointment.    Depression Screening and Follow-up Plan: Patient was screened for depression during today's encounter. They screened negative with a PHQ-2 score of 0.      History of Present Illness     Vamsi Olmedo is a 48 year old male who presents  today with a complaint of lower back pain, pain shooting down his legs, and difficulty walking. He is in a wheelchair in the exam room and has a cane. He is accompanied by his girlfriend today. He reports he has been to the ED 2x for this problem. He would like an injection. He has an upcoming appointment with orthopedics.      Leg Pain   The incident occurred more than 1 week ago. There was no injury mechanism. The pain is present in the left leg and right leg. The quality of the pain is described as shooting, stabbing and aching. The pain is severe. The pain has been Constant since onset. Associated symptoms include an inability to bear weight. He reports no foreign bodies present. The symptoms are aggravated by movement and weight bearing. He has tried non-weight bearing (muscle relaxer) for the symptoms. The treatment provided mild relief.       Review of Systems   Constitutional: Negative.  Negative for chills, fatigue and fever.   HENT: Negative.  Negative for congestion, ear pain, rhinorrhea and sore throat.    Eyes: Negative.  Negative for pain and visual disturbance.   Respiratory: Negative.  Negative for cough and shortness of breath.    Cardiovascular: Negative.  Negative for chest pain, palpitations and leg swelling.   Gastrointestinal:  Negative for abdominal pain, constipation, diarrhea, nausea and vomiting.   Endocrine: Negative.    Genitourinary: Negative.  Negative for dysuria, frequency and urgency.   Musculoskeletal:  Positive for arthralgias, back pain, gait problem and myalgias. Negative for joint swelling, neck pain and neck stiffness.   Skin: Negative.  Negative for rash.   Allergic/Immunologic: Negative.    Neurological:  Negative for dizziness, weakness, light-headedness and headaches.   Hematological: Negative.    Psychiatric/Behavioral: Negative.         Objective     /78 (BP Location: Right arm, Patient Position: Sitting, Cuff Size: Large)   Pulse 94   Temp 97.9 °F (36.6 °C)  (Tympanic)   Resp 18   SpO2 97%     Physical Exam  Vitals and nursing note reviewed.   Constitutional:       General: He is not in acute distress.     Appearance: Normal appearance. He is not ill-appearing.   HENT:      Head: Normocephalic and atraumatic.   Eyes:      Extraocular Movements: Extraocular movements intact.      Conjunctiva/sclera: Conjunctivae normal.      Pupils: Pupils are equal, round, and reactive to light.   Cardiovascular:      Rate and Rhythm: Normal rate and regular rhythm.      Heart sounds: Normal heart sounds. No murmur heard.  Pulmonary:      Effort: Pulmonary effort is normal. No respiratory distress.      Breath sounds: Normal breath sounds. No wheezing.   Musculoskeletal:         General: Tenderness present.      Cervical back: Normal range of motion.      Thoracic back: Spasms present. No swelling.      Lumbar back: Tenderness present. No swelling or bony tenderness.      Comments: The patient is in a wheelchair for his visit.   Skin:     General: Skin is warm and dry.      Capillary Refill: Capillary refill takes less than 2 seconds.   Neurological:      General: No focal deficit present.      Mental Status: He is alert and oriented to person, place, and time.   Psychiatric:         Mood and Affect: Mood normal.         Behavior: Behavior normal.       Administrative Statements

## 2024-06-13 ENCOUNTER — PATIENT OUTREACH (OUTPATIENT)
Dept: FAMILY MEDICINE CLINIC | Facility: CLINIC | Age: 49
End: 2024-06-13

## 2024-06-13 NOTE — ASSESSMENT & PLAN NOTE
The patient has an upcoming appointment with orthopedics. A referral was placed to spine and pain management today. Methocarbamol 500 mg refilled today. The patient has a large back muscle spasm. Recommend heat, massage, and lidocaine patch.

## 2024-06-13 NOTE — PROGRESS NOTES
Outpatient Care Management Note:    New direct referral received from Marsha Brown. Patient was in office on 6/12 with complaints of sciatica. He was given a referral to spine and pain. He is scheduled to see orthopedics on 6/26. He needs to schedule appts with several specialists: endocrine, neurology, GI, Ophthalmology, dental, cardiology, nephrology, podiatry, and physical therapy. He is currently self pay and in the process of obtaining medicaid.     Patient is currently in [facility] ER since yesterday with complaints of back pain. CM will attempt to outreach another day.

## 2024-06-13 NOTE — ASSESSMENT & PLAN NOTE
The patient requires assistance navigating the medical system and establishing with new specialists. A referral to complex care management was placed today. He is currently self pay and in the process of obtaining Medicaid.

## 2024-06-14 ENCOUNTER — PATIENT OUTREACH (OUTPATIENT)
Dept: FAMILY MEDICINE CLINIC | Facility: CLINIC | Age: 49
End: 2024-06-14

## 2024-06-14 NOTE — PROGRESS NOTES
Outpatient Care Management Note:    Chart reviewed: patient was in [facility] ER on 6/12/24 with back pain, renal insufficiency, hyperkalemia, and leg weakness. His potassium was discontinued. And his glargine insulin was changed to 58 units daily. He is to use tylenol 500 mg, take 2 tablets, 3 times per day for back pain. He was instructed to avoid NSAIDS. He can use bengay or aspercreme to the area. He was ordered a BMP  to be completed 6/17. He is to follow up with his PCP in 1 week and keep his appt with orthopedics on 6/26.     Voicemail message left for Vamsi, with my contact information, introducing myself and requesting a call back.

## 2024-06-20 ENCOUNTER — PATIENT OUTREACH (OUTPATIENT)
Dept: FAMILY MEDICINE CLINIC | Facility: CLINIC | Age: 49
End: 2024-06-20

## 2024-06-20 NOTE — PROGRESS NOTES
Outpatient Care Management Note:    Voice mail message left for Vamsi, with my contact information, introducing myself and requesting a call back.

## 2024-06-20 NOTE — LETTER
Date: 06/20/24    Dear Vamsi Olmedo,   My name is Mare Fuller. I am a registered nurse care manager working with   [address]  [facility] [id number] [phone].   I have not been able to reach you and would like to set a time that I can talk with you over the phone.  My work is to help patients that have complex medical conditions get the care they need. This includes patients who may have been in the hospital or emergency room.    Please call me with any questions you may have. I look forward to speaking with you.  Sincerely,  Mare Fuller, RN  [phone]   Outpatient Care Manager  Copy:  (primary care physician name and address)

## 2024-06-26 ENCOUNTER — APPOINTMENT (OUTPATIENT)
Dept: RADIOLOGY | Facility: CLINIC | Age: 49
End: 2024-06-26

## 2024-06-26 VITALS
HEIGHT: 73 IN | SYSTOLIC BLOOD PRESSURE: 120 MMHG | WEIGHT: 294 LBS | BODY MASS INDEX: 38.97 KG/M2 | DIASTOLIC BLOOD PRESSURE: 90 MMHG

## 2024-06-26 DIAGNOSIS — M70.62 TROCHANTERIC BURSITIS OF LEFT HIP: ICD-10-CM

## 2024-06-26 DIAGNOSIS — Z76.89 ENCOUNTER TO ESTABLISH CARE WITH NEW DOCTOR: ICD-10-CM

## 2024-06-26 DIAGNOSIS — M54.32 SCIATICA OF LEFT SIDE: ICD-10-CM

## 2024-06-26 DIAGNOSIS — M54.42 ACUTE LEFT-SIDED LOW BACK PAIN WITH LEFT-SIDED SCIATICA: Primary | ICD-10-CM

## 2024-06-26 PROCEDURE — 20610 DRAIN/INJ JOINT/BURSA W/O US: CPT | Performed by: FAMILY MEDICINE

## 2024-06-26 PROCEDURE — 73502 X-RAY EXAM HIP UNI 2-3 VIEWS: CPT

## 2024-06-26 PROCEDURE — 99204 OFFICE O/P NEW MOD 45 MIN: CPT | Performed by: FAMILY MEDICINE

## 2024-06-26 RX ORDER — TRIAMCINOLONE ACETONIDE 40 MG/ML
40 INJECTION, SUSPENSION INTRA-ARTICULAR; INTRAMUSCULAR
Status: COMPLETED | OUTPATIENT
Start: 2024-06-26 | End: 2024-06-26

## 2024-06-26 RX ORDER — LIDOCAINE HYDROCHLORIDE 10 MG/ML
9 INJECTION, SOLUTION INFILTRATION; PERINEURAL
Status: COMPLETED | OUTPATIENT
Start: 2024-06-26 | End: 2024-06-26

## 2024-06-26 RX ADMIN — LIDOCAINE HYDROCHLORIDE 9 ML: 10 INJECTION, SOLUTION INFILTRATION; PERINEURAL at 14:30

## 2024-06-26 RX ADMIN — TRIAMCINOLONE ACETONIDE 40 MG: 40 INJECTION, SUSPENSION INTRA-ARTICULAR; INTRAMUSCULAR at 14:30

## 2024-06-26 NOTE — PATIENT INSTRUCTIONS
I explained the patient that he appears to have pinched nerve in his spine causing pain rating to the leg as well as localized back spasm.  I recommended trial of home exercise program physical therapy and have referred patient to pain management for expedited evaluation to consider epidural steroid injection.

## 2024-06-26 NOTE — PROGRESS NOTES
1. Acute left-sided low back pain with left-sided sciatica  [facility]    Ambulatory referral to Spine & Pain Management      2. Encounter to establish care with new doctor  Ambulatory Referral to Orthopedic Surgery      3. Sciatica of left side  Ambulatory Referral to Orthopedic Surgery    XR hip/pelv 2-3 vws left if performed      4. Trochanteric bursitis of left hip  Large joint arthrocentesis: L greater trochanteric bursa        Orders Placed This Encounter   Procedures    Large joint arthrocentesis: L greater trochanteric bursa    XR hip/pelv 2-3 vws left if performed    Ambulatory referral to Spine & Pain Management    [facility]        IMAGING STUDIES: (I personally reviewed images in PACS and report):    MRI Lumbar spine 6/12/24:  Disc bulge     1. Grade 1 retrolisthesis of L5 on S1.   2. Epidural lipomatosis.   3. Spondylosis with facet arthropathy.   4. Disc bulging, far lateral disc protrusions at multiple levels. See above.   5. Although there is no bony central stenosis there is diffuse epidural   lipomatosis and a small thecal sac.   6. Right foramina stenosis at L2-L3 through L5-S1.   7. Left foramina stenosis at L2-L3 through L5-S1.       Xray Left Hip 6/26/24:  No acute osseous abn. Mild OA. +CAM      ASSESSMENT/PLAN:  Complex left-sided lumbar radiculopathy  Left Hip Trochanteric Bursitis  PMH: Chronic kidney disease seeing nephrology in [place], recent admission [facility] for hyperkalemia 6/12/2024, history of diabetes on insulin with hemoglobin A1c 8.5 on 6/12/2024      Repeat X-ray next visit: None    Return for Follow-up with specialist.    Patient instructions below verbally summarized in person during encounter:  Patient Instructions   I explained the patient that he appears to have pinched nerve in his spine causing pain rating to the leg as well as localized back spasm.  I recommended trial of home exercise program physical therapy and have referred patient  to pain management for expedited evaluation to consider epidural steroid injection.      __________________________________________________________________________    HISTORY OF PRESENT ILLNESS:    Patient here for evaluation of low back lumbar pain.  Chronic for approximately 10 years.  In the past patient had a injection for what sounds like trochanteric bursitis with relief.  Denies having epidural injections.  Recently worse in the past 1 month with no injury event.  Points to the left lower lumbar region source of pain as well as lateral hip.     Pain Scale: 10 out of 10  Patient multiple ER visits due to intractable pain.  Recent admission for kidney issues per patient  Radiation Down Leg: Bilateral  LE Parasthesias: Bilateral    Physical Therapy: None    Denies any saddle anesthesia, new onset bowel/bladder incontinence, fevers, personal history of cancer, unintentional weight loss    Patient complains of bilateral subjective foot weakness left greater than right        Review of Systems      Following history reviewed and update:    Past Medical History:   Diagnosis Date    Alcohol abuse     sober for 1 year with 1 relapse - currently not drinking    Arthritis     CHF (congestive heart failure) ([facility])     Diabetes ([facility])     Diabetic neuropathy associated with type 2 diabetes mellitus (HCC)     GERD (gastroesophageal reflux disease)     Gout     Hyperlipemia     Hypertension     Memory loss     Obesity     Schizo affective schizophrenia ([facility])     Sciatica     Seizures ([facility])     2018 - was in coma    Visual impairment      Past Surgical History:   Procedure Laterality Date    CARDIAC DEFIBRILLATOR PLACEMENT      2014    CARDIAC DEFIBRILLATOR PLACEMENT      replaced september 2023    CARDIAC DEFIBRILLATOR REMOVAL      Sept 2023     Social History   Social History     Substance and Sexual Activity   Alcohol Use Not Currently     Social History     Substance and Sexual Activity   Drug Use Not Currently    Types:  "Marijuana     Social History     Tobacco Use   Smoking Status Former    Types: Cigars   Smokeless Tobacco Never     No family history on file.  Allergies   Allergen Reactions    Penicillin V Rash          Physical Exam  /90 (BP Location: Left arm, Patient Position: Sitting, Cuff Size: Standard)   Ht 6' 1\" (1.854 m)   Wt 133 kg (294 lb)   BMI 38.79 kg/m²         Ortho Exam    BACK EXAM:    BACK TENDERNESS:  Spinous Processes: no  Paraspinal Muscles: +LLL  SI Joint:   Sacrum:     DERMATOMAL SENSATION:  L1: normal   L2: normal   L3: normal   Diminished feet bilateral symmetric    STRENGTH (bilateral):  Knee Extension: 5/5  Foot Dorsiflexion: 5/5  Great Toe Extension: 5/5 right; left 4/5  Foot Plantarflexion: 5/5  Hip Flexion: 5/5  Hip Abduction: 5/5    LEFT HIP:  +lateral hip tenderness    __________________________________________________________________________  Large joint arthrocentesis: L greater trochanteric bursa  Universal Protocol:  Consent: Verbal consent obtained.  Risks and benefits: risks, benefits and alternatives were discussed  Consent given by: patient  Time out: Immediately prior to procedure a \"time out\" was called to verify the correct patient, procedure, equipment, support staff and site/side marked as required.  Patient understanding: patient states understanding of the procedure being performed  Site marked: the operative site was marked  Patient identity confirmed: verbally with patient  Supporting Documentation  Indications: pain and diagnostic evaluation   Procedure Details  Location: hip - L greater trochanteric bursa  Preparation: Patient was prepped and draped in the usual sterile fashion  Needle size: 22 G  Ultrasound guidance: no  Approach: lateral  Medications administered: 9 mL lidocaine 1 %; 40 mg triamcinolone acetonide 40 mg/mL    Patient tolerance: patient tolerated the procedure well with no immediate complications  Dressing:  Sterile dressing " applied

## 2024-07-03 ENCOUNTER — PATIENT OUTREACH (OUTPATIENT)
Dept: FAMILY MEDICINE CLINIC | Facility: CLINIC | Age: 49
End: 2024-07-03

## 2024-07-03 NOTE — PROGRESS NOTES
Outpatient Care Management Note:    Unable to reach letter was sent on 6/20.    No response to telephone calls or unable to reach letter sent to patient.  Patient closed to care management services, but CM will remain available if they call back.  Re-consult as needed.

## 2024-10-04 ENCOUNTER — TELEPHONE (OUTPATIENT)
Age: 49
End: 2024-10-04

## 2025-01-20 ENCOUNTER — TELEPHONE (OUTPATIENT)
Age: 50
End: 2025-01-20

## 2025-01-20 NOTE — TELEPHONE ENCOUNTER
"Behavioral Health Outpatient Intake Questions    Referred By   : Does not remember    Please advise interviewee that they need to answer all questions truthfully to allow for best care, and any misrepresentations of information may affect their ability to be seen at this clinic   => Was this discussed? Yes     If Minor Child (under age 18)    Who is/are the legal guardian(s) of the child?     Is there a custody agreement?      If \"YES\"- Custody orders must be obtained prior to scheduling the first appointment  In addition, Consent to Treatment must be signed by all legal guardians prior to scheduling the first appointment    If \"NO\"- Consent to Treatment must be signed by all legal guardians prior to scheduling the first appointment    Behavioral Health Outpatient Intake History -     Presenting Problem (in patient's own words):   Schizophrenia    Are there any communication barriers for this patient?     No                                               If yes, please describe barriers:   If there is a unique situation, please refer to Soto Dickson/Brenda Dai for final determination.    Are you taking any psychiatric medications? No     If \"YES\" -What are they      If \"YES\" -Who prescribes?     Has the Patient previously received outpatient Talk Therapy or Medication Management from [facility]  No        If \"YES\"- When, Where and with Whom?         If \"NO\" -Has Patient received these services elsewhere? No      If \"YES\" -When, Where, and with Whom?    Has the Patient abused alcohol or other substances in the last 6 months ? No  No concerns of substance abuse are reported.     If \"YES\" -What substance, How much, How often?     If illegal substance: Refer to [facility] (for [employer]) or [facility].   If Alcohol in excess of 10 drinks per week:  Refer to [facility] (for [employer]) or [facility]    Legal History-     Is this treatment court ordered? No   If \"yes \"send to :  Talk Therapy : Send to Soto Dickson " "for final determination   Med Management: Send to Dr. Cordova for final determination     Has the Patient been convicted of a felony?  No   If \"Yes\" send to -When, What?  Talk Therapy: Send to Soto Dickson for final determination   Med Management: Send to Dr. Cordova for final determination     ACCEPTED as a patient Yes  If \"Yes\" Appointment Date: 04/16/2025 @ 11am w/ Dustin HAN     Referred Elsewhere? No  If “Yes” - (Where? Ex: [facility], [facility], [facility], [facility], etc.)       Name of Insurance Co: [employer] [place]  Insurance ID# [id number]   Insurance Phone #  If ins is primary or secondary?  If patient is a minor, parents information such as Name, D.O.B of guarantor.  "

## 2025-02-19 ENCOUNTER — TELEPHONE (OUTPATIENT)
Dept: PSYCHIATRY | Facility: CLINIC | Age: 50
End: 2025-02-19

## 2025-02-19 NOTE — TELEPHONE ENCOUNTER
Writer called and left voicemail for client to let him know that the follow up scheduled with his first appointment with Dustin Chappell will have to be rescheduled as provider will be out of office.    Client's New Patient appointment in April is NOT affected. Client can call to make follow up ahead of time or make new follow up at time of new patient appointment.